# Patient Record
Sex: FEMALE | Race: BLACK OR AFRICAN AMERICAN | NOT HISPANIC OR LATINO | Employment: UNEMPLOYED | URBAN - METROPOLITAN AREA
[De-identification: names, ages, dates, MRNs, and addresses within clinical notes are randomized per-mention and may not be internally consistent; named-entity substitution may affect disease eponyms.]

---

## 2024-02-06 ENCOUNTER — LAB (OUTPATIENT)
Dept: LAB | Facility: LAB | Age: 34
End: 2024-02-06
Payer: COMMERCIAL

## 2024-02-06 ENCOUNTER — OFFICE VISIT (OUTPATIENT)
Dept: PRIMARY CARE | Facility: CLINIC | Age: 34
End: 2024-02-06
Payer: COMMERCIAL

## 2024-02-06 VITALS
WEIGHT: 191.1 LBS | HEART RATE: 69 BPM | DIASTOLIC BLOOD PRESSURE: 75 MMHG | BODY MASS INDEX: 29.99 KG/M2 | TEMPERATURE: 97.5 F | HEIGHT: 67 IN | OXYGEN SATURATION: 99 % | SYSTOLIC BLOOD PRESSURE: 115 MMHG

## 2024-02-06 DIAGNOSIS — Z13.220 LIPID SCREENING: ICD-10-CM

## 2024-02-06 DIAGNOSIS — Z00.00 HEALTH MAINTENANCE EXAMINATION: ICD-10-CM

## 2024-02-06 DIAGNOSIS — F10.90 HEAVY ALCOHOL USE: ICD-10-CM

## 2024-02-06 DIAGNOSIS — G56.01 CARPAL TUNNEL SYNDROME OF RIGHT WRIST: ICD-10-CM

## 2024-02-06 DIAGNOSIS — Z11.3 ROUTINE SCREENING FOR STI (SEXUALLY TRANSMITTED INFECTION): ICD-10-CM

## 2024-02-06 DIAGNOSIS — Z00.00 HEALTH MAINTENANCE EXAMINATION: Primary | ICD-10-CM

## 2024-02-06 DIAGNOSIS — Z23 IMMUNIZATION DUE: ICD-10-CM

## 2024-02-06 PROBLEM — Z15.02 GENETIC PREDISPOSITION TO OVARIAN CANCER: Status: ACTIVE | Noted: 2024-02-06

## 2024-02-06 PROBLEM — G56.00 CARPAL TUNNEL SYNDROME: Status: ACTIVE | Noted: 2024-02-06

## 2024-02-06 PROBLEM — T83.32XA IUD STRINGS LOST: Status: ACTIVE | Noted: 2024-02-06

## 2024-02-06 PROBLEM — D57.3 SICKLE CELL TRAIT (CMS-HCC): Status: ACTIVE | Noted: 2024-02-06

## 2024-02-06 LAB
ANION GAP SERPL CALC-SCNC: 10 MMOL/L (ref 10–20)
BUN SERPL-MCNC: 11 MG/DL (ref 6–23)
CALCIUM SERPL-MCNC: 9.6 MG/DL (ref 8.6–10.6)
CHLORIDE SERPL-SCNC: 106 MMOL/L (ref 98–107)
CHOLEST SERPL-MCNC: 113 MG/DL (ref 0–199)
CHOLESTEROL/HDL RATIO: 2.8
CO2 SERPL-SCNC: 30 MMOL/L (ref 21–32)
CREAT SERPL-MCNC: 0.59 MG/DL (ref 0.5–1.05)
EGFRCR SERPLBLD CKD-EPI 2021: >90 ML/MIN/1.73M*2
GLUCOSE SERPL-MCNC: 85 MG/DL (ref 74–99)
HCV AB SER QL: NONREACTIVE
HDLC SERPL-MCNC: 40.7 MG/DL
HIV 1+2 AB+HIV1 P24 AG SERPL QL IA: ABNORMAL
NON-HDL CHOLESTEROL: 72 MG/DL (ref 0–149)
POTASSIUM SERPL-SCNC: 4.2 MMOL/L (ref 3.5–5.3)
SODIUM SERPL-SCNC: 142 MMOL/L (ref 136–145)
TREPONEMA PALLIDUM IGG+IGM AB [PRESENCE] IN SERUM OR PLASMA BY IMMUNOASSAY: NONREACTIVE

## 2024-02-06 PROCEDURE — 86803 HEPATITIS C AB TEST: CPT

## 2024-02-06 PROCEDURE — 99213 OFFICE O/P EST LOW 20 MIN: CPT | Performed by: STUDENT IN AN ORGANIZED HEALTH CARE EDUCATION/TRAINING PROGRAM

## 2024-02-06 PROCEDURE — 86703 HIV-1/HIV-2 1 RESULT ANTBDY: CPT

## 2024-02-06 PROCEDURE — 80048 BASIC METABOLIC PNL TOTAL CA: CPT

## 2024-02-06 PROCEDURE — 87491 CHLMYD TRACH DNA AMP PROBE: CPT

## 2024-02-06 PROCEDURE — 99395 PREV VISIT EST AGE 18-39: CPT | Performed by: STUDENT IN AN ORGANIZED HEALTH CARE EDUCATION/TRAINING PROGRAM

## 2024-02-06 PROCEDURE — 87536 HIV-1 QUANT&REVRSE TRNSCRPJ: CPT

## 2024-02-06 PROCEDURE — 87591 N.GONORRHOEAE DNA AMP PROB: CPT

## 2024-02-06 PROCEDURE — 90686 IIV4 VACC NO PRSV 0.5 ML IM: CPT | Performed by: STUDENT IN AN ORGANIZED HEALTH CARE EDUCATION/TRAINING PROGRAM

## 2024-02-06 PROCEDURE — 86780 TREPONEMA PALLIDUM: CPT

## 2024-02-06 PROCEDURE — 87800 DETECT AGNT MULT DNA DIREC: CPT

## 2024-02-06 PROCEDURE — 36415 COLL VENOUS BLD VENIPUNCTURE: CPT

## 2024-02-06 PROCEDURE — 83718 ASSAY OF LIPOPROTEIN: CPT

## 2024-02-06 PROCEDURE — 87661 TRICHOMONAS VAGINALIS AMPLIF: CPT

## 2024-02-06 PROCEDURE — 90471 IMMUNIZATION ADMIN: CPT | Performed by: STUDENT IN AN ORGANIZED HEALTH CARE EDUCATION/TRAINING PROGRAM

## 2024-02-06 PROCEDURE — 87389 HIV-1 AG W/HIV-1&-2 AB AG IA: CPT

## 2024-02-06 PROCEDURE — 82465 ASSAY BLD/SERUM CHOLESTEROL: CPT

## 2024-02-06 PROCEDURE — 1036F TOBACCO NON-USER: CPT | Performed by: STUDENT IN AN ORGANIZED HEALTH CARE EDUCATION/TRAINING PROGRAM

## 2024-02-06 RX ORDER — MELOXICAM 15 MG/1
15 TABLET ORAL DAILY PRN
Qty: 30 TABLET | Refills: 0 | Status: SHIPPED | OUTPATIENT
Start: 2024-02-06 | End: 2024-03-07

## 2024-02-06 SDOH — ECONOMIC STABILITY: FOOD INSECURITY: WITHIN THE PAST 12 MONTHS, YOU WORRIED THAT YOUR FOOD WOULD RUN OUT BEFORE YOU GOT MONEY TO BUY MORE.: NEVER TRUE

## 2024-02-06 SDOH — ECONOMIC STABILITY: FOOD INSECURITY: WITHIN THE PAST 12 MONTHS, THE FOOD YOU BOUGHT JUST DIDN'T LAST AND YOU DIDN'T HAVE MONEY TO GET MORE.: NEVER TRUE

## 2024-02-06 ASSESSMENT — ENCOUNTER SYMPTOMS
FREQUENCY: 0
UNEXPECTED WEIGHT CHANGE: 0
LIGHT-HEADEDNESS: 0
DIARRHEA: 0
DIFFICULTY URINATING: 0
TREMORS: 0
DYSURIA: 0
WEAKNESS: 0
CONSTIPATION: 0
ABDOMINAL PAIN: 0
SHORTNESS OF BREATH: 0
CHEST TIGHTNESS: 0
PHOTOPHOBIA: 0
FATIGUE: 0
FEVER: 0
HEMATURIA: 0

## 2024-02-06 ASSESSMENT — PATIENT HEALTH QUESTIONNAIRE - PHQ9
1. LITTLE INTEREST OR PLEASURE IN DOING THINGS: NOT AT ALL
2. FEELING DOWN, DEPRESSED OR HOPELESS: NOT AT ALL
SUM OF ALL RESPONSES TO PHQ9 QUESTIONS 1 & 2: 0

## 2024-02-06 ASSESSMENT — LIFESTYLE VARIABLES
HOW MANY STANDARD DRINKS CONTAINING ALCOHOL DO YOU HAVE ON A TYPICAL DAY: 5 OR 6
HOW OFTEN DO YOU HAVE SIX OR MORE DRINKS ON ONE OCCASION: NEVER
AUDIT-C TOTAL SCORE: 5
SKIP TO QUESTIONS 9-10: 0
HOW OFTEN DO YOU HAVE A DRINK CONTAINING ALCOHOL: 2-3 TIMES A WEEK

## 2024-02-06 NOTE — PROGRESS NOTES
Subjective   Patient ID: Brianne Mclean is a 34 y.o. female  with h/o sickle cell trait and carpal tunnel who presents for Annual Exam.    HPI    #wrist pain   - pain started in , present intermittently, described as a shooting pain that moves down her 2nd and 3rd fingers  - patient was given a cortisol shot in the wrist (), which did help with symptoms  - the pain makes it hard for the patient to sleep   - being on phone for too long or laying down makes it worse  - patient has tried to use a brace, but it does not improve the pain     # Health Maintenance  - Patient's rating of their own health: Good  - Dental Care: last prior dental visit within the past year, denies current tooth pain  - Vision: corrective devices: none // recent vision: stable  - Hearing: denies recent hearing loss  - Weight: stable  - Smoking: never a smoker  - EtOH: Alcohol Use: Yes, patient drinks alcohol.  Alcohol Use: Heavy Drinker (2024)    AUDIT-C     Frequency of Alcohol Consumption: 2-3 times a week     Average Number of Drinks: 5 or 6     Frequency of Binge Drinking: Never   - Illicit substances: denied.    WOMEN  - Menstrual Status: Induced amenorrhea  (hormonal IUD)  - No LMP recorded.  - Pregnancy history:   - Cervical CA: last prior pap 2021 NILM/HPV-  Sexual History:  - Sexually active? Yes   - Gender of historical partner(s): male  - Number of partners in the past 6mo: 3  - Attempting pregnancy? No   - Contraception: IUD due out   - Sexual Dysfunction? No  - Known STI exposure? No    Review of Systems   Constitutional:  Negative for fatigue, fever and unexpected weight change.   Eyes:  Negative for photophobia and visual disturbance.   Respiratory:  Negative for chest tightness and shortness of breath.    Cardiovascular:  Negative for chest pain and leg swelling.   Gastrointestinal:  Negative for abdominal pain, constipation and diarrhea.   Genitourinary:  Negative for difficulty urinating, dysuria,  frequency, hematuria, menstrual problem and pelvic pain.   Neurological:  Negative for tremors, syncope, weakness and light-headedness.       Previous history  Past Medical History:   Diagnosis Date    Other conditions influencing health status     H/O pregnancy    Other specified health status     Uses condoms for contraception     Past Surgical History:   Procedure Laterality Date     SECTION, CLASSIC  2021     Section     Social History     Tobacco Use    Smoking status: Never    Smokeless tobacco: Never   Substance Use Topics    Alcohol use: Yes    Drug use: Never     No family history on file.    No Known Allergies    Current Outpatient Medications   Medication Instructions    levonorgestreL (LILETTA) 20.4 mcg/24 hrs (8 yrs) 52 mg intrauterine device intrauterine       Objective       Physical Exam  Constitutional:       General: She is not in acute distress.     Appearance: Normal appearance. She is not ill-appearing.   HENT:      Head: Normocephalic and atraumatic.      Nose: Nose normal.   Eyes:      General: No scleral icterus.        Right eye: No discharge.         Left eye: No discharge.      Extraocular Movements: Extraocular movements intact.      Conjunctiva/sclera: Conjunctivae normal.      Pupils: Pupils are equal, round, and reactive to light.   Cardiovascular:      Pulses: Normal pulses.      Heart sounds: Normal heart sounds. No murmur heard.     No friction rub. No gallop.   Pulmonary:      Effort: Pulmonary effort is normal. No respiratory distress.      Breath sounds: Normal breath sounds. No stridor. No wheezing or rales.   Musculoskeletal:      Comments: + tinel and phalen   Skin:     General: Skin is warm and dry.   Neurological:      General: No focal deficit present.      Mental Status: She is alert and oriented to person, place, and time.      Cranial Nerves: No cranial nerve deficit.      Motor: No weakness.      Gait: Gait normal.       PHQ2:  Over the past 2  weeks, how often have you been bothered by any of the following problems?  Little interest or pleasure in doing things: Not at all  Feeling down, depressed, or hopeless: Not at all    Food Insecurity: No Food Insecurity (2/6/2024)    Hunger Vital Sign     Worried About Running Out of Food in the Last Year: Never true     Ran Out of Food in the Last Year: Never true     Assessment/Plan   Brianne Mclean is a 34 y.o. female who presents for the concerns below:    # Routine Health Maintenance  Immunization History   Administered Date(s) Administered    Flu vaccine (IIV4), preservative free *Check age/dose* 01/31/2020, 02/06/2024    HPV, Quadrivalent 06/01/2007, 08/24/2007, 02/04/2008    Hepatitis B vaccine, pediatric/adolescent (RECOMBIVAX, ENGERIX) 10/27/2004, 02/09/2005, 04/11/2005    Influenza, live, intranasal 11/17/2010    MMR vaccine, subcutaneous (MMR II) 10/27/2004    Meningococcal MCV4P 01/18/2006    PPD Test 09/10/2014    Tdap vaccine, age 7 year and older (BOOSTRIX, ADACEL) 12/02/2009, 11/22/2017    Varicella vaccine, subcutaneous (VARIVAX) 10/27/2004, 12/02/2009     - Flu vaccine: recommended annually, given today  - COVID vaccine: recommended completion of primary series and recommended boosters, declined  - Tdap: next due 2027  - STI screen: ordered GC, chlamydia, trich, syphilis, HIV, HepC  - Lipid Panel (35M,45F): ordered  - HTN screening: wnl today  - Food Insecurity screen: neg  - Depression: PHQ-2 neg  - Last Dental: recommended follow up every 6mo  - Last Eye exam: recommended follow up annually  - Pap smear (21-65F): next due 6/2026    Problem List Items Addressed This Visit       Carpal tunnel syndrome    Relevant Medications    meloxicam (Mobic) 15 mg tablet    Other Relevant Orders    Referral to Orthopaedic Surgery    Heavy alcohol use     Discussed positive AUDIT-C with negative AUDIT questions, consistent with heavy alcohol use rather than alcoholism. Discussed recommendation to cut down on  volume and/or frequency of drinking for overall health, patient understanding and in agreement.          Other Visit Diagnoses       Health maintenance examination    -  Primary    Relevant Orders    Basic metabolic panel (Completed)    Immunization due        Relevant Orders    Flu vaccine (IIV4) age 6 months and greater, preservative free (Completed)    Routine screening for STI (sexually transmitted infection)        Relevant Orders    C. Trachomatis / N. Gonorrhoeae, Amplified Detection (Completed)    Hepatitis C Antibody (Completed)    HIV 1/2 Antigen/Antibody Screen with Reflex to Confirmation (Completed)    Trichomonas vaginalis, Nucleic Acid Detection (Completed)    Syphilis Screen with Reflex (Completed)    Lipid screening        Relevant Orders    Lipid Panel Non-Fasting (Completed)          Iglesia Villalobos, MS3    ---    I saw and evaluated the patient with the medical student. I personally obtained the key and critical portions of the history and physical exam. I reviewed and modified the student's documentation and discussed patient with the medical student. I agree with the above documentation and medical decision making.     Roxanne Fritz MD  Family Medicine PGY-3

## 2024-02-07 ENCOUNTER — TELEPHONE (OUTPATIENT)
Dept: PRIMARY CARE | Facility: CLINIC | Age: 34
End: 2024-02-07
Payer: COMMERCIAL

## 2024-02-07 DIAGNOSIS — Z11.4 SCREENING FOR HIV (HUMAN IMMUNODEFICIENCY VIRUS): Primary | ICD-10-CM

## 2024-02-07 LAB
C TRACH RRNA SPEC QL NAA+PROBE: NEGATIVE
HIV 1 & 2 AB SERPL IA.RAPID: NEGATIVE
N GONORRHOEA DNA SPEC QL PROBE+SIG AMP: NEGATIVE
T VAGINALIS RRNA SPEC QL NAA+PROBE: NEGATIVE

## 2024-02-07 NOTE — TELEPHONE ENCOUNTER
Called patient to discuss preliminary positive results of HIV screen, confirmed identity with  and talked for approx 5min. Discussed what this means and that we don't know for sure that she's HIV+ at this time, as this initial test is a screening test. We need the results of the HIV PCR test to determine whether she has been infected with HIV. Patient had multiple questions about what an HIV infection would mean and what next steps would be, emphasized that if she's HIV+ and takes her medication consistently, this is NOT a life limiting illness in the way that it once was, and it's more so a chronic medical condition. Emphasized that not managing this condition chronically can have serious health consequences. Encouraged her to take this one step at a time and wait for the PCR to come back before she worries too much. All questions answered, patient thankful for call. Will call her again when the PCR comes back, whether it's positive or negative. Sent her [employer] message so she can reach out in the meantime if needed.    Roxanne Fritz MD  Family Medicine PGY-3

## 2024-02-08 LAB
HIV-INTEGRATED COMMENT: NORMAL
HIV1 RNA # PLAS NAA DL=20: NOT DETECTED {COPIES}/ML
HIV1 RNA SPEC NAA+PROBE-LOG#: NORMAL {LOG_COPIES}/ML

## 2024-02-08 NOTE — TELEPHONE ENCOUNTER
Confirmatory HIV test negative. Tried to call patient, but no answer. Recommend repeating test in 3 months to make sure we aren't missing an early infection. Order placed.    Roxanne Fritz MD  Family Medicine PGY-3

## 2024-02-12 NOTE — ASSESSMENT & PLAN NOTE
- positive tinel and phalen sign, and presenting symptoms, make carpal tunnel syndrome the most likely diagnosis   - Meloxicam 15 mg tablet to use as needed daily  - Referral to orthopedic surgery for repeat steroid injection vs carpal tunnel release; discussed OT and continued splinting, patient not interested in these

## 2024-02-12 NOTE — ASSESSMENT & PLAN NOTE
Discussed positive AUDIT-C with negative AUDIT questions, consistent with heavy alcohol use rather than alcoholism. Discussed recommendation to cut down on volume and/or frequency of drinking for overall health, patient understanding and in agreement.

## 2024-02-13 NOTE — PROGRESS NOTES
I reviewed the resident/fellow's documentation and discussed the patient with the resident/fellow. I agree with the resident/fellow's medical decision making as documented in the note.     David Acuna MD

## 2024-02-15 ENCOUNTER — OFFICE VISIT (OUTPATIENT)
Dept: ORTHOPEDIC SURGERY | Facility: CLINIC | Age: 34
End: 2024-02-15
Payer: COMMERCIAL

## 2024-02-15 ENCOUNTER — HOSPITAL ENCOUNTER (OUTPATIENT)
Dept: RADIOLOGY | Facility: CLINIC | Age: 34
Discharge: HOME | End: 2024-02-15
Payer: COMMERCIAL

## 2024-02-15 VITALS — HEIGHT: 67 IN | WEIGHT: 190 LBS | BODY MASS INDEX: 29.82 KG/M2

## 2024-02-15 DIAGNOSIS — G56.01 CARPAL TUNNEL SYNDROME OF RIGHT WRIST: ICD-10-CM

## 2024-02-15 PROCEDURE — 73110 X-RAY EXAM OF WRIST: CPT | Mod: RT

## 2024-02-15 PROCEDURE — 73110 X-RAY EXAM OF WRIST: CPT | Mod: RIGHT SIDE | Performed by: RADIOLOGY

## 2024-02-15 PROCEDURE — 1036F TOBACCO NON-USER: CPT | Performed by: ORTHOPAEDIC SURGERY

## 2024-02-15 PROCEDURE — 99203 OFFICE O/P NEW LOW 30 MIN: CPT | Performed by: ORTHOPAEDIC SURGERY

## 2024-02-15 ASSESSMENT — PAIN SCALES - GENERAL: PAINLEVEL: 2

## 2024-02-15 NOTE — LETTER
February 15, 2024     Roxanne Fritz MD  [id number] [facility] [id number]    Patient: Brianne Mclean   YOB: 1990   Date of Visit: 2/15/2024       Dear Dr. Roxanne Fritz MD:    Thank you for referring Brianne Mclean to me for evaluation. Below are my notes for this consultation.  If you have questions, please do not hesitate to call me. I look forward to following your patient along with you.       Sincerely,     Edward Liao MD      CC: David Acuna MD  ______________________________________________________________________________________    Chief complaint chronic progressive right hand numbness and tingling  Going on for several years  No history of injury  No history of diabetes  No known neurologic disease  Treatment has included nighttime splinting  Her symptoms are worse at night  She comes in requesting injection    Past medical,family and social histories have been reviewed and are up to date.  All other body systems have been reviewed and are negative for complaint.          Awake alert oriented appropriate  Normal cervical spine exam  Right hand: There is no thenar atrophy digits are mobile diminished sensibility in the median digits is patient as well as the small finger  Negative Tinel's at the elbow positive Tinel's and Phalen's at the wrist    X-rays of the wrist negative    Impression carpal tunnel syndrome no better with nighttime splinting    Plan: Check EMG  Continue splinting  Discussed possible surgical intervention

## 2024-02-16 NOTE — PROGRESS NOTES
Chief complaint chronic progressive right hand numbness and tingling  Going on for several years  No history of injury  No history of diabetes  No known neurologic disease  Treatment has included nighttime splinting  Her symptoms are worse at night  She comes in requesting injection    Past medical,family and social histories have been reviewed and are up to date.  All other body systems have been reviewed and are negative for complaint.          Awake alert oriented appropriate  Normal cervical spine exam  Right hand: There is no thenar atrophy digits are mobile diminished sensibility in the median digits is patient as well as the small finger  Negative Tinel's at the elbow positive Tinel's and Phalen's at the wrist    X-rays of the wrist negative    Impression carpal tunnel syndrome no better with nighttime splinting    Plan: Check EMG  Continue splinting  Discussed possible surgical intervention

## 2024-07-30 ENCOUNTER — LAB (OUTPATIENT)
Dept: LAB | Facility: LAB | Age: 34
End: 2024-07-30
Payer: MEDICAID

## 2024-07-30 ENCOUNTER — OFFICE VISIT (OUTPATIENT)
Dept: OBSTETRICS AND GYNECOLOGY | Facility: HOSPITAL | Age: 34
End: 2024-07-30
Payer: MEDICAID

## 2024-07-30 VITALS — WEIGHT: 197.4 LBS | DIASTOLIC BLOOD PRESSURE: 72 MMHG | SYSTOLIC BLOOD PRESSURE: 108 MMHG | BODY MASS INDEX: 30.92 KG/M2

## 2024-07-30 DIAGNOSIS — Z12.4 SCREENING FOR CERVICAL CANCER: ICD-10-CM

## 2024-07-30 DIAGNOSIS — Z01.419 WELL WOMAN EXAM: Primary | ICD-10-CM

## 2024-07-30 DIAGNOSIS — Z11.4 SCREENING FOR HIV (HUMAN IMMUNODEFICIENCY VIRUS): ICD-10-CM

## 2024-07-30 DIAGNOSIS — Z30.09 ENCOUNTER FOR FEMALE FAMILY PLANNING COUNSELING: ICD-10-CM

## 2024-07-30 DIAGNOSIS — Z11.3 ROUTINE SCREENING FOR STI (SEXUALLY TRANSMITTED INFECTION): ICD-10-CM

## 2024-07-30 LAB
HCV AB SER QL: NONREACTIVE
HIV 1+2 AB+HIV1 P24 AG SERPL QL IA: ABNORMAL
TREPONEMA PALLIDUM IGG+IGM AB [PRESENCE] IN SERUM OR PLASMA BY IMMUNOASSAY: NONREACTIVE

## 2024-07-30 PROCEDURE — 1036F TOBACCO NON-USER: CPT

## 2024-07-30 PROCEDURE — 86703 HIV-1/HIV-2 1 RESULT ANTBDY: CPT

## 2024-07-30 PROCEDURE — 87491 CHLMYD TRACH DNA AMP PROBE: CPT

## 2024-07-30 PROCEDURE — 99385 PREV VISIT NEW AGE 18-39: CPT

## 2024-07-30 PROCEDURE — 87389 HIV-1 AG W/HIV-1&-2 AB AG IA: CPT

## 2024-07-30 PROCEDURE — 86803 HEPATITIS C AB TEST: CPT

## 2024-07-30 PROCEDURE — 87340 HEPATITIS B SURFACE AG IA: CPT

## 2024-07-30 PROCEDURE — 87536 HIV-1 QUANT&REVRSE TRNSCRPJ: CPT

## 2024-07-30 PROCEDURE — 86780 TREPONEMA PALLIDUM: CPT

## 2024-07-30 PROCEDURE — 36415 COLL VENOUS BLD VENIPUNCTURE: CPT

## 2024-07-30 PROCEDURE — 87661 TRICHOMONAS VAGINALIS AMPLIF: CPT

## 2024-07-30 ASSESSMENT — COLUMBIA-SUICIDE SEVERITY RATING SCALE - C-SSRS
2. HAVE YOU ACTUALLY HAD ANY THOUGHTS OF KILLING YOURSELF?: NO
6. HAVE YOU EVER DONE ANYTHING, STARTED TO DO ANYTHING, OR PREPARED TO DO ANYTHING TO END YOUR LIFE?: NO
1. IN THE PAST MONTH, HAVE YOU WISHED YOU WERE DEAD OR WISHED YOU COULD GO TO SLEEP AND NOT WAKE UP?: NO

## 2024-07-30 ASSESSMENT — PAIN SCALES - GENERAL: PAINLEVEL: 0-NO PAIN

## 2024-07-30 ASSESSMENT — ENCOUNTER SYMPTOMS
LOSS OF SENSATION IN FEET: 0
DEPRESSION: 0
OCCASIONAL FEELINGS OF UNSTEADINESS: 0

## 2024-07-30 ASSESSMENT — PATIENT HEALTH QUESTIONNAIRE - PHQ9
SUM OF ALL RESPONSES TO PHQ9 QUESTIONS 1 AND 2: 0
1. LITTLE INTEREST OR PLEASURE IN DOING THINGS: NOT AT ALL
2. FEELING DOWN, DEPRESSED OR HOPELESS: NOT AT ALL

## 2024-07-30 NOTE — PROGRESS NOTES
Assessment/Plan   Problem List Items Addressed This Visit             ICD-10-CM    Well woman exam - Primary Z01.419    Routine screening for STI (sexually transmitted infection) Z11.3    Relevant Orders    HIV 1/2 Antigen/Antibody Screen with Reflex to Confirmation    Hepatitis B Surface Antigen    Hepatitis C Antibody    Syphilis Screen with Reflex    Trichomonas vaginalis, Nucleic Acid Detection    C. Trachomatis / N. Gonorrhoeae, Amplified Detection    Screening for cervical cancer Z12.4    Relevant Orders    THINPREP PAP TEST (>30)    Encounter for female family planning counseling Z30.09   If patient desires BTL, patient aware to schedule consultation with MD partner to determine if she is a good candidate and then be scheduled from the consult if she is deemed a good candidate and she wishes to proceed.  Patient verbalized understanding.    AMITA Burns-MIYA     Subjective   Briannemax Mclean is a 34 y.o. female who is here for a routine exam. Periods are irregular due to Liletta.  No intermenstrual bleeding, spotting, or discharge.  Desires more information regarding BTL.  Has 1 child that will soon be 11; does not desire any more children.    ROS      /72   Wt 89.5 kg (197 lb 6.4 oz)   LMP  (LMP Unknown)   BMI 30.92 kg/m²     General:   Alert and oriented x 3   Heart:  Thyroid: Regular rate, rhythm  Euthyroid, normal shape and size   Lungs:  Breast: Clear to auscultation bilaterally  Symmetrical, no skin changes/nipple discharge, redness, tenderness, no masses palpated bilaterally   Abdomen: Soft, non tender   Vulva: EGBUS normal   Vagina: Pink, normal discharge   Cervix: No CMT   Uterus: Normal shape, size   Adnexa: NT bilaterally       Thank you for coming to see me for your visit today and most importantly thank you for having a preventative visit.  If lab work was sent, you will see your test result via [employer]  Any prescriptions will be sent electronically to your pharmacy  listed    I look forward to seeing you next year for your health care needs.  Please remember:   Sleep is important!   Exercise such as walking for 20 minutes per day is beneficial to your physical and mental health   Sleep is so important and should be a priority!   Healthy diets can be expensive -- if you every feel like you are struggling to afford healthy food, please let me know as we have a very good program called [employer] that is available to you   Decrease alcohol and tobacco    Be well!  Nadiya

## 2024-07-31 LAB
C TRACH RRNA SPEC QL NAA+PROBE: NEGATIVE
HBV SURFACE AG SERPL QL IA: NONREACTIVE
HIV 1 & 2 AB SERPL IA.RAPID: NEGATIVE
N GONORRHOEA DNA SPEC QL PROBE+SIG AMP: NEGATIVE
T VAGINALIS RRNA SPEC QL NAA+PROBE: NEGATIVE

## 2024-08-07 LAB
CYTOLOGY CMNT CVX/VAG CYTO-IMP: NORMAL
HPV HR 12 DNA GENITAL QL NAA+PROBE: NEGATIVE
HPV HR GENOTYPES PNL CVX NAA+PROBE: NEGATIVE
HPV16 DNA SPEC QL NAA+PROBE: NEGATIVE
HPV18 DNA SPEC QL NAA+PROBE: NEGATIVE
LAB AP HPV GENOTYPE QUESTION: YES
LAB AP HPV HR: NORMAL
LABORATORY COMMENT REPORT: NORMAL
PATH REPORT.TOTAL CANCER: NORMAL